# Patient Record
Sex: FEMALE | Race: WHITE | NOT HISPANIC OR LATINO | Employment: UNEMPLOYED | URBAN - METROPOLITAN AREA
[De-identification: names, ages, dates, MRNs, and addresses within clinical notes are randomized per-mention and may not be internally consistent; named-entity substitution may affect disease eponyms.]

---

## 2021-12-10 ENCOUNTER — NURSE TRIAGE (OUTPATIENT)
Dept: NURSING | Facility: CLINIC | Age: 31
End: 2021-12-10

## 2021-12-10 ENCOUNTER — HOSPITAL ENCOUNTER (EMERGENCY)
Facility: CLINIC | Age: 31
Discharge: HOME | End: 2021-12-10
Attending: EMERGENCY MEDICINE | Admitting: EMERGENCY MEDICINE
Payer: COMMERCIAL

## 2021-12-10 ENCOUNTER — APPOINTMENT (OUTPATIENT)
Dept: ULTRASOUND IMAGING | Facility: CLINIC | Age: 31
End: 2021-12-10
Attending: EMERGENCY MEDICINE
Payer: COMMERCIAL

## 2021-12-10 VITALS
BODY MASS INDEX: 20.66 KG/M2 | HEART RATE: 91 BPM | OXYGEN SATURATION: 100 % | TEMPERATURE: 98.2 F | HEIGHT: 65 IN | DIASTOLIC BLOOD PRESSURE: 61 MMHG | SYSTOLIC BLOOD PRESSURE: 110 MMHG | WEIGHT: 124 LBS | RESPIRATION RATE: 16 BRPM

## 2021-12-10 DIAGNOSIS — R10.31 ABDOMINAL PAIN, RIGHT LOWER QUADRANT: ICD-10-CM

## 2021-12-10 LAB
ANION GAP SERPL CALCULATED.3IONS-SCNC: 4 MMOL/L (ref 3–14)
BASOPHILS # BLD AUTO: 0 10E3/UL (ref 0–0.2)
BASOPHILS NFR BLD AUTO: 1 %
BUN SERPL-MCNC: 16 MG/DL (ref 7–30)
CALCIUM SERPL-MCNC: 8.9 MG/DL (ref 8.5–10.1)
CHLORIDE BLD-SCNC: 113 MMOL/L (ref 94–109)
CO2 SERPL-SCNC: 25 MMOL/L (ref 20–32)
CREAT SERPL-MCNC: 0.68 MG/DL (ref 0.52–1.04)
EOSINOPHIL # BLD AUTO: 0.1 10E3/UL (ref 0–0.7)
EOSINOPHIL NFR BLD AUTO: 2 %
ERYTHROCYTE [DISTWIDTH] IN BLOOD BY AUTOMATED COUNT: 11.9 % (ref 10–15)
GFR SERPL CREATININE-BSD FRML MDRD: >90 ML/MIN/1.73M2
GLUCOSE BLD-MCNC: 114 MG/DL (ref 70–99)
HCG SERPL QL: NEGATIVE
HCT VFR BLD AUTO: 35.9 % (ref 35–47)
HGB BLD-MCNC: 12 G/DL (ref 11.7–15.7)
HOLD SPECIMEN: NORMAL
HOLD SPECIMEN: NORMAL
IMM GRANULOCYTES # BLD: 0 10E3/UL
IMM GRANULOCYTES NFR BLD: 0 %
LYMPHOCYTES # BLD AUTO: 2.1 10E3/UL (ref 0.8–5.3)
LYMPHOCYTES NFR BLD AUTO: 36 %
MCH RBC QN AUTO: 28.8 PG (ref 26.5–33)
MCHC RBC AUTO-ENTMCNC: 33.4 G/DL (ref 31.5–36.5)
MCV RBC AUTO: 86 FL (ref 78–100)
MONOCYTES # BLD AUTO: 0.3 10E3/UL (ref 0–1.3)
MONOCYTES NFR BLD AUTO: 5 %
NEUTROPHILS # BLD AUTO: 3.2 10E3/UL (ref 1.6–8.3)
NEUTROPHILS NFR BLD AUTO: 56 %
NRBC # BLD AUTO: 0 10E3/UL
NRBC BLD AUTO-RTO: 0 /100
PLATELET # BLD AUTO: 208 10E3/UL (ref 150–450)
POTASSIUM BLD-SCNC: 3.6 MMOL/L (ref 3.4–5.3)
RBC # BLD AUTO: 4.17 10E6/UL (ref 3.8–5.2)
SODIUM SERPL-SCNC: 142 MMOL/L (ref 133–144)
WBC # BLD AUTO: 5.7 10E3/UL (ref 4–11)

## 2021-12-10 PROCEDURE — 76830 TRANSVAGINAL US NON-OB: CPT

## 2021-12-10 PROCEDURE — 80048 BASIC METABOLIC PNL TOTAL CA: CPT | Performed by: EMERGENCY MEDICINE

## 2021-12-10 PROCEDURE — 250N000013 HC RX MED GY IP 250 OP 250 PS 637: Performed by: EMERGENCY MEDICINE

## 2021-12-10 PROCEDURE — 84703 CHORIONIC GONADOTROPIN ASSAY: CPT | Performed by: EMERGENCY MEDICINE

## 2021-12-10 PROCEDURE — 85025 COMPLETE CBC W/AUTO DIFF WBC: CPT | Performed by: EMERGENCY MEDICINE

## 2021-12-10 PROCEDURE — 36415 COLL VENOUS BLD VENIPUNCTURE: CPT | Performed by: EMERGENCY MEDICINE

## 2021-12-10 PROCEDURE — 99285 EMERGENCY DEPT VISIT HI MDM: CPT | Mod: 25

## 2021-12-10 RX ORDER — IBUPROFEN 400 MG/1
400 TABLET, FILM COATED ORAL ONCE
Status: COMPLETED | OUTPATIENT
Start: 2021-12-10 | End: 2021-12-10

## 2021-12-10 RX ADMIN — IBUPROFEN 400 MG: 400 TABLET ORAL at 02:56

## 2021-12-10 ASSESSMENT — ENCOUNTER SYMPTOMS
NAUSEA: 1
ABDOMINAL PAIN: 1
VOMITING: 0
DYSURIA: 0
CHILLS: 0
SHORTNESS OF BREATH: 1
FEVER: 0

## 2021-12-10 ASSESSMENT — MIFFLIN-ST. JEOR: SCORE: 1283.34

## 2021-12-10 NOTE — ED PROVIDER NOTES
"  History   Chief Complaint:  Abdominal Pain       HPI   Isela Malone is a 31 year old female who presents with abdominal pain. Per the patient, she is having her 1st post partum period after birth last July. She woke up about 2 hours ago with nausea, shortness of breath, and right lower quadrant abdominal pain radiating to her lower back. She also endorses vaginal bleeding but is unsure if this heavy or not, and is filling a menstrual cup every 4 hours. While at the ER, she states her pain is at a 2 on the pain scale. She denies fever, chills, dysuria, and vomiting. She has had normal bowel movements. She has not taken anything for her symptoms. She is not breast feeding. She has no history of ovarian cysts.    Review of Systems   Constitutional: Negative for chills and fever.   Respiratory: Positive for shortness of breath.    Gastrointestinal: Positive for abdominal pain and nausea. Negative for vomiting.        Negative for stool symptoms   Genitourinary: Positive for vaginal bleeding. Negative for dysuria.   All other systems reviewed and are negative.      Allergies:  The patient has no known allergies.     Medications:  The patient is not currently taking any prescribed medications.    Past Medical History:     Asthma    Family History:    Father: BPH, liver cancer  Mother: Bipolar disorder, schizophrenia, thyroid disease    Social History:  The patient was unaccompanied to the ER  The patient lives in [place]    Physical Exam     Patient Vitals for the past 24 hrs:   BP Temp Temp src Pulse Resp SpO2 Height Weight   12/10/21 0113 110/61 98.2  F (36.8  C) Oral 91 16 100 % 1.651 m (5' 5\") 56.2 kg (124 lb)       Physical Exam  General: alert, lying comfortably on gurney  HENT: mucous membranes moist  CV: regular rate, regular rhythm  Resp: normal effort, clear throughout, no crackles or wheezing  GI: abdomen soft, very mild RLQ tenderness.  No rebound or guarding.  MSK: no bony tenderness  Skin: " appropriately warm and dry  Extremities: no edema, calves non-tender  Neuro: alert, clear speech, oriented  Psych: normal mood and affect    Emergency Department Course     Imaging:    US Pelvis Cmplt w Transvag & Doppler LmtPel Duplex Limited   Final Result   IMPRESSION:    1. Unremarkable appearance of the uterus and ovaries. Blood flow is visualized in both ovaries.   2. No free fluid in the pelvis.         The above imaging workup was performed.   Report per radiology    Laboratory:  Labs Ordered and Resulted from Time of ED Arrival to Time of ED Departure   BASIC METABOLIC PANEL - Abnormal       Result Value    Sodium 142      Potassium 3.6      Chloride 113 (*)     Carbon Dioxide (CO2) 25      Anion Gap 4      Urea Nitrogen 16      Creatinine 0.68      Calcium 8.9      Glucose 114 (*)     GFR Estimate >90     HCG QUALITATIVE PREGNANCY - Normal    hCG Serum Qualitative Negative     CBC WITH PLATELETS AND DIFFERENTIAL    WBC Count 5.7      RBC Count 4.17      Hemoglobin 12.0      Hematocrit 35.9      MCV 86      MCH 28.8      MCHC 33.4      RDW 11.9      Platelet Count 208      % Neutrophils 56      % Lymphocytes 36      % Monocytes 5      % Eosinophils 2      % Basophils 1      % Immature Granulocytes 0      NRBCs per 100 WBC 0      Absolute Neutrophils 3.2      Absolute Lymphocytes 2.1      Absolute Monocytes 0.3      Absolute Eosinophils 0.1      Absolute Basophils 0.0      Absolute Immature Granulocytes 0.0      Absolute NRBCs 0.0        Emergency Department Course:    Reviewed:  I reviewed nursing notes, vitals and past medical history    Assessments:  0156 I obtained history and examined the patient as noted above.   0343 I rechecked the patient and explained findings.     Interventions:  0256: Ibuprofen, 400 mg, Oral    Disposition:  The patient was discharged to home.     Impression & Plan     CMS Diagnoses: None    Medical Decision Making:  Isela Malone is a 31 year old female who presents with  severe right lower quadrant abdominal pain.  She think she just had her first menstrual cycle since she stopped breast feeding her daughter a few weeks ago.  In the setting of this, she developed very severe abdominal cramping.  Upon my evaluation, her pain had resolved.  Vitals are normal, exam is unremarkable.  Labs are normal, pregnancy test is negative.  Ultrasound is negative for evidence of ovarian torsion.  Pain has not returned.  Symptoms most likely related to menstruation.  Return for severe pain, fever, vomiting, or other concerns.    Diagnosis:    ICD-10-CM    1. Abdominal pain, right lower quadrant  R10.31      Scribe Disclosure:  I, Ye Kaye, am serving as a scribe at 1:40 AM on 12/10/2021 to document services personally performed by Micaela Barnes MD based on my observations and the provider's statements to me.        Micaela Barnes MD  12/10/21 0645

## 2021-12-10 NOTE — ED TRIAGE NOTES
Patient states having her 1st post partum period & the heaviest bleeding she has ever had.  Using a menstrual cup, filling every 4 hours.    Severe pain to RL abdomen.

## 2021-12-10 NOTE — TELEPHONE ENCOUNTER
Pt had her first period since her baby - having pain on right side of abdomin and not sure if it is just period cramps.    Rates pain 8/10 - states that pain is so bad that she is unable to take a breath     Per protocol - See HCP within 4 hours    Pt is going to ED now to be evaluated     Care advice given per protocol and when to call back. Pt verbalized understanding and agrees to plan of care.    Kavitha Meng RN  [facility] Nurse Advisor  12:39 AM 12/10/2021      COVID 19 Nurse Triage Plan/Patient Instructions    Please be aware that novel coronavirus (COVID-19) may be circulating in the community. If you develop symptoms such as fever, cough, or SOB or if you have concerns about the presence of another infection including coronavirus (COVID-19), please contact your health care provider or visit [email or web address].     Disposition/Instructions    ED Visit recommended. Follow protocol based instructions.     Bring Your Own Device:  Please also bring your smart device(s) (smart phones, tablets, laptops) and their charging cables for your personal use and to communicate with your care team during your visit.    Thank you for taking steps to prevent the spread of this virus.  o Limit your contact with others.  o Wear a simple mask to cover your cough.  o Wash your hands well and often.    Resources    [facility]: About COVID-19: [email or web address]    CDC: What to Do If You're Sick: www.cdc.gov/coronavirus/2019-ncov/about/steps-when-sick.html    CDC: Ending Home Isolation: www.cdc.gov/coronavirus/2019-ncov/hcp/disposition-in-home-patients.html     CDC: Caring for Someone: www.cdc.gov/coronavirus/2019-ncov/if-you-are-sick/care-for-someone.html     [facility]: Interim Guidance for Hospital Discharge to Home: www.health[facility]/diseases/coronavirus/hcp/hospdischarge.pdf    [facility] clinical trials (COVID-19 research studies): [email or web address]      Below are the COVID-19 hotlines at the [place] Department of Health ([facility]). Interpreters are available.   o For health questions: Call 681-481-6550 or 1-232.125.1800 (7 a.m. to 7 p.m.)  o For questions about schools and childcare: Call 542-133-1965 or 1-109.481.2483 (7 a.m. to 7 p.m.)                                  Reason for Disposition    [1] SEVERE vaginal bleeding (e.g., soaking 2 pads or tampons per hour and present 2 or more hours) AND [2] worse than ever before    Additional Information    Negative: Sounds like a life-threatening emergency to the triager    Negative: Abdominal cramps unrelated to menstrual period    Negative: [1] SEVERE pain AND [2] pain clearly increases with coughing    Negative: Patient sounds very sick or weak to the triager    Protocols used: ABDOMINAL PAIN - MENSTRUAL CRAMPS-A-AH